# Patient Record
Sex: MALE | Employment: FULL TIME | URBAN - METROPOLITAN AREA
[De-identification: names, ages, dates, MRNs, and addresses within clinical notes are randomized per-mention and may not be internally consistent; named-entity substitution may affect disease eponyms.]

---

## 2019-10-23 ENCOUNTER — OFFICE VISIT (OUTPATIENT)
Dept: INTERNAL MEDICINE | Facility: CLINIC | Age: 59
End: 2019-10-23
Payer: COMMERCIAL

## 2019-10-23 VITALS
RESPIRATION RATE: 12 BRPM | BODY MASS INDEX: 29.54 KG/M2 | HEART RATE: 84 BPM | OXYGEN SATURATION: 98 % | SYSTOLIC BLOOD PRESSURE: 122 MMHG | WEIGHT: 211 LBS | TEMPERATURE: 98 F | DIASTOLIC BLOOD PRESSURE: 78 MMHG | HEIGHT: 71 IN

## 2019-10-23 DIAGNOSIS — J02.0 STREPTOCOCCAL PHARYNGITIS: Primary | ICD-10-CM

## 2019-10-23 PROCEDURE — 99203 OFFICE O/P NEW LOW 30 MIN: CPT | Performed by: PHYSICIAN ASSISTANT

## 2019-10-23 RX ORDER — AMOXICILLIN 500 MG/1
500 CAPSULE ORAL 2 TIMES DAILY
Qty: 20 CAPSULE | Refills: 0 | Status: SHIPPED | OUTPATIENT
Start: 2019-10-23 | End: 2019-11-02

## 2019-10-23 ASSESSMENT — MIFFLIN-ST. JEOR: SCORE: 1791.28

## 2019-10-23 NOTE — PROGRESS NOTES
"Subjective     Chidi Cleveland is a 59 year old male who presents to clinic today for the following health issues:    HPI   RESPIRATORY SYMPTOMS      Duration: 7-10 days    Description  sore throat, chills, ear pain bilateral while swallowing and swollen lymph nodes  Throat the worse   Chills/achy/sweats   No cough     Severity: severe    Accompanying signs and symptoms: None    History (predisposing factors):  Was in a hospital with his wife about 2 weeks ago     Precipitating or alleviating factors: got worse today    Therapies tried and outcome:  Ibuprofen and Dayquil    Reviewed and updated as needed this visit by Provider  Meds  Problems             Objective    /78   Pulse 84   Temp 98  F (36.7  C) (Oral)   Resp 12   Ht 1.791 m (5' 10.5\")   Wt 95.7 kg (211 lb)   SpO2 98%   BMI 29.85 kg/m    Body mass index is 29.85 kg/m .  Physical Exam   GENERAL: healthy, alert and no distress  EYES: Eyes grossly normal to inspection, PERRL and conjunctivae and sclerae normal  HENT: normal cephalic/atraumatic, ear canals and TM's normal, nose and mouth without ulcers or lesions, oropharynx clear and oral mucous membranes moist- erythema and swollen tonsils, but no signs of exudates or abscesses   NECK: bilateral anterior cervical adenopathy  RESP: lungs clear to auscultation - no rales, rhonchi or wheezes  CV: regular rates and rhythm, normal S1 S2, no S3 or S4 and no murmur, click or rub    Diagnostic Test Results:  Labs reviewed in Epic        Assessment & Plan     1. Streptococcal pharyngitis  - with fever, erythematous and swollen tonsils, and lymphadenopathy without cough, treated presumptively for strep   - reviewed when to follow up and when to seek emergency care  - reviewed when swollen nodes should return to normal and follow up if this does not occur   - amoxicillin (AMOXIL) 500 MG capsule; Take 1 capsule (500 mg) by mouth 2 times daily for 10 days  Dispense: 20 capsule; Refill: 0     Return in about " 1 month (around 11/23/2019) for follow up lymph nodes .    Mis Abdullahi PA-C  [facility]

## 2023-02-09 ENCOUNTER — OFFICE VISIT (OUTPATIENT)
Dept: URGENT CARE | Facility: URGENT CARE | Age: 63
End: 2023-02-09
Payer: COMMERCIAL

## 2023-02-09 VITALS
DIASTOLIC BLOOD PRESSURE: 70 MMHG | HEART RATE: 89 BPM | TEMPERATURE: 99 F | SYSTOLIC BLOOD PRESSURE: 130 MMHG | OXYGEN SATURATION: 97 %

## 2023-02-09 DIAGNOSIS — M25.50 ARTHRALGIA OF MULTIPLE JOINTS: Primary | ICD-10-CM

## 2023-02-09 LAB
ALBUMIN SERPL BCG-MCNC: 4 G/DL (ref 3.5–5.2)
ALBUMIN UR-MCNC: 100 MG/DL
ALP SERPL-CCNC: 61 U/L (ref 40–129)
ALT SERPL W P-5'-P-CCNC: 10 U/L (ref 10–50)
ANION GAP SERPL CALCULATED.3IONS-SCNC: 13 MMOL/L (ref 7–15)
APPEARANCE UR: CLEAR
AST SERPL W P-5'-P-CCNC: 18 U/L (ref 10–50)
BACTERIA #/AREA URNS HPF: ABNORMAL /HPF
BASOPHILS # BLD AUTO: 0 10E3/UL (ref 0–0.2)
BASOPHILS NFR BLD AUTO: 0 %
BILIRUB SERPL-MCNC: 0.9 MG/DL
BILIRUB UR QL STRIP: ABNORMAL
BUN SERPL-MCNC: 14.1 MG/DL (ref 8–23)
CALCIUM SERPL-MCNC: 8.9 MG/DL (ref 8.8–10.2)
CHLORIDE SERPL-SCNC: 100 MMOL/L (ref 98–107)
COLOR UR AUTO: YELLOW
CREAT SERPL-MCNC: 1.04 MG/DL (ref 0.67–1.17)
CRP SERPL-MCNC: 77.3 MG/L
DEPRECATED HCO3 PLAS-SCNC: 23 MMOL/L (ref 22–29)
EOSINOPHIL # BLD AUTO: 0.1 10E3/UL (ref 0–0.7)
EOSINOPHIL NFR BLD AUTO: 0 %
ERYTHROCYTE [DISTWIDTH] IN BLOOD BY AUTOMATED COUNT: 11.6 % (ref 10–15)
ERYTHROCYTE [SEDIMENTATION RATE] IN BLOOD BY WESTERGREN METHOD: 26 MM/HR (ref 0–20)
GFR SERPL CREATININE-BSD FRML MDRD: 82 ML/MIN/1.73M2
GLUCOSE SERPL-MCNC: 131 MG/DL (ref 70–99)
GLUCOSE UR STRIP-MCNC: NEGATIVE MG/DL
HCT VFR BLD AUTO: 44.8 % (ref 40–53)
HGB BLD-MCNC: 15 G/DL (ref 13.3–17.7)
HGB UR QL STRIP: NEGATIVE
IMM GRANULOCYTES # BLD: 0.1 10E3/UL
IMM GRANULOCYTES NFR BLD: 1 %
KETONES UR STRIP-MCNC: 80 MG/DL
LEUKOCYTE ESTERASE UR QL STRIP: NEGATIVE
LYMPHOCYTES # BLD AUTO: 0.6 10E3/UL (ref 0.8–5.3)
LYMPHOCYTES NFR BLD AUTO: 3 %
MCH RBC QN AUTO: 33 PG (ref 26.5–33)
MCHC RBC AUTO-ENTMCNC: 33.5 G/DL (ref 31.5–36.5)
MCV RBC AUTO: 99 FL (ref 78–100)
MONOCYTES # BLD AUTO: 0.7 10E3/UL (ref 0–1.3)
MONOCYTES NFR BLD AUTO: 3 %
MUCOUS THREADS #/AREA URNS LPF: PRESENT /LPF
NEUTROPHILS # BLD AUTO: 17.8 10E3/UL (ref 1.6–8.3)
NEUTROPHILS NFR BLD AUTO: 93 %
NITRATE UR QL: NEGATIVE
NRBC # BLD AUTO: 0 10E3/UL
NRBC BLD AUTO-RTO: 0 /100
PH UR STRIP: 6 [PH] (ref 5–7)
PLATELET # BLD AUTO: 227 10E3/UL (ref 150–450)
POTASSIUM SERPL-SCNC: 4.5 MMOL/L (ref 3.4–5.3)
PROT SERPL-MCNC: 7.3 G/DL (ref 6.4–8.3)
RBC # BLD AUTO: 4.55 10E6/UL (ref 4.4–5.9)
RBC #/AREA URNS AUTO: ABNORMAL /HPF
SODIUM SERPL-SCNC: 136 MMOL/L (ref 136–145)
SP GR UR STRIP: 1.02 (ref 1–1.03)
SQUAMOUS #/AREA URNS AUTO: ABNORMAL /LPF
TSH SERPL DL<=0.005 MIU/L-ACNC: 1.16 UIU/ML (ref 0.3–4.2)
UROBILINOGEN UR STRIP-ACNC: 1 E.U./DL
WBC # BLD AUTO: 19.2 10E3/UL (ref 4–11)
WBC #/AREA URNS AUTO: ABNORMAL /HPF

## 2023-02-09 PROCEDURE — 36415 COLL VENOUS BLD VENIPUNCTURE: CPT | Performed by: FAMILY MEDICINE

## 2023-02-09 PROCEDURE — 86140 C-REACTIVE PROTEIN: CPT | Performed by: FAMILY MEDICINE

## 2023-02-09 PROCEDURE — 86431 RHEUMATOID FACTOR QUANT: CPT | Performed by: FAMILY MEDICINE

## 2023-02-09 PROCEDURE — 86706 HEP B SURFACE ANTIBODY: CPT | Performed by: FAMILY MEDICINE

## 2023-02-09 PROCEDURE — 99000 SPECIMEN HANDLING OFFICE-LAB: CPT | Performed by: FAMILY MEDICINE

## 2023-02-09 PROCEDURE — 80050 GENERAL HEALTH PANEL: CPT | Performed by: FAMILY MEDICINE

## 2023-02-09 PROCEDURE — 86618 LYME DISEASE ANTIBODY: CPT | Performed by: FAMILY MEDICINE

## 2023-02-09 PROCEDURE — 81001 URINALYSIS AUTO W/SCOPE: CPT | Performed by: FAMILY MEDICINE

## 2023-02-09 PROCEDURE — 87340 HEPATITIS B SURFACE AG IA: CPT | Performed by: FAMILY MEDICINE

## 2023-02-09 PROCEDURE — 86803 HEPATITIS C AB TEST: CPT | Performed by: FAMILY MEDICINE

## 2023-02-09 PROCEDURE — 85652 RBC SED RATE AUTOMATED: CPT | Performed by: FAMILY MEDICINE

## 2023-02-09 PROCEDURE — 99204 OFFICE O/P NEW MOD 45 MIN: CPT | Performed by: FAMILY MEDICINE

## 2023-02-09 PROCEDURE — 86747 PARVOVIRUS ANTIBODY: CPT | Mod: 90 | Performed by: FAMILY MEDICINE

## 2023-02-09 NOTE — PROGRESS NOTES
ASSESSMENT/ PLAN:  Arthralgia of multiple joints     - Lyme Disease Total Abs Bld with Reflex to Confirm CLIA; Future  - Hepatitis C antibody; Future  - Hepatitis B surface antigen; Future  - Hepatitis B Surface Antibody; Future  - Comprehensive metabolic panel (BMP + Alb, Alk Phos, ALT, AST, Total. Bili, TP); Future  - UA Macro with Reflex to Micro and Culture - lab collect; Future  - Parvovirus B19 antibodies IgG IgM; Future  - ESR: Erythrocyte sedimentation rate; Future  - CRP, inflammation; Future  - CBC with platelets and differential; Future  - Rheumatoid factor; Future  - TSH with free T4 reflex; Future  - Lyme Disease Total Abs Bld with Reflex to Confirm CLIA  - Hepatitis C antibody  - Hepatitis B surface antigen  - Hepatitis B Surface Antibody  - Comprehensive metabolic panel (BMP + Alb, Alk Phos, ALT, AST, Total. Bili, TP)  - UA Macro with Reflex to Micro and Culture - lab collect  - Parvovirus B19 antibodies IgG IgM  - ESR: Erythrocyte sedimentation rate  - CRP, inflammation  - CBC with platelets and differential  - Rheumatoid factor  - TSH with free T4 reflex  - Urine Microscopic        We discussed the possible causes of the patient's musculoskeletal pain  ,     We discussed the possibilities of viral infection, lyme disease autoimmune disease, and malignancy    that are the most common causes of these symptoms.    Screening tests will be performed today to evaluate common infections and testing of for signs of generalized inflammation that can be a sign of an autoimmune disorder or malignancy    Patient will be informed of results and may need to follow-up with primary care or with rheumatology  for ongoing testing/ evaluation if the symptoms persist or worsen    Called patient at home with initial lab results-  His elevated WBC and elevated sed rate are suggestive of infection.   He denies abdominal pain or chest pain to suspect abdominal abscess or  Pneumonia.  -  If symptoms develop in the  coming days he should go to the ER.  Urine shows no signs of infection.  He will be called about subsequent lab results and will be able to review lab results with primary care at his scheduled follow-up appointment.     acetaminophen and/or ibuprofen for pain  Warm packs to the site of pain     ------------------------------------------------------------------------------------------------------    SUBJECTIVE:  Chief Complaint   Patient presents with     Musculoskeletal Problem     Started with knee, ankle pain a week ago and now having upper body pains as well.      Chidi Cleveland is a 62 year old male who presents with a chief complaint of bilateral ankle, knee, wrist, elbow, shoulder, hand, back and neck pain, tenderness and decreased range of motion in the joints  There has been slight to no noted swelling of the   joints  There has been all day and morning stiffness especially in the bilateral knees, but increasing  The joint pain is present in the morning and is persistent as the day goes on  Symptoms began 1 week(s) ago, are moderate and worsening and has spread to more joints-  First the knees and ankles, now back, neck, wrists, shoulder, elbows, fingers  Context:  He has not had signs of illness, no cough, no GI symptoms  There has been no associated fevers/ chills,  No Muscle pain, No  Nausea/ vomiting  History of arthritis/ gout  or autoimmune disorders:  none  Family history of arthritis or autoimmune disorders -  His mother had rheumatoid arthritis  Recent travel or exposure to similar illnesses: - none known,  Though he may have had distant tick bite  Recent illness  none    Injury:None       PMH:  No history of chronic health conditions    ALLERGIES:  Aspirin    MEDs  No current outpatient medications on file prior to visit.  No current facility-administered medications on file prior to visit.      Social History     Tobacco Use     Smoking status: Former     Packs/day: 0.00     Types: Cigarettes      Quit date: 2005     Years since quittin.5     Smokeless tobacco: Never   Substance Use Topics     Alcohol use: Yes     Comment: case of beer/week (24)        Family History   Problem Relation Age of Onset     Cancer Father         renal cell carcinoma (60)     Genitourinary Problems Mother         kidney stones     Arthritis Mother        ROS:  CONSTITUTIONAL:NEGATIVE for fever, chills,   INTEGUMENTARY/SKIN: NEGATIVE for worrisome rashes, or lesions  EYES: NEGATIVE for vision changes or irritation  ENT/MOUTH: NEGATIVE for ear, mouth and throat problems  RESP:NEGATIVE for significant cough or SOB  GI: NEGATIVE for nausea, abdominal pain,  or change in bowel habits       EXAM:   /70 (BP Location: Right arm, Patient Position: Chair, Cuff Size: Adult Regular)   Pulse 89   Temp 99  F (37.2  C) (Tympanic)   SpO2 97%   M/S Exam:bilateral ankle, knee, wrist, elbow, shoulder and hand with report of aching pain of the joint, not the muscles with movement and stress to the joints.    No erythema or swelling noted of the joints  Able to walk on toes, heels, tandem walk without difficulty,    GENERAL APPEARANCE: alert, moderate distress and cooperative  EYES: EOMI,  PERRL, conjunctiva clear  HENT: ear canals and TM's normal.  Nose and mouth without ulcers, erythema or lesions  NECK: supple, nontender, no lymphadenopathy  RESP: lungs clear to auscultation - no rales, rhonchi or wheezes  CV: regular rates and rhythm, normal S1 S2, no murmur noted  ABDOMEN:  soft, nontender, no HSM or masses and bowel sounds normal  NEURO: Normal strength and tone, sensory exam grossly normal,  normal speech and mentation  SKIN: no suspicious lesions or rashes       Results for orders placed or performed in visit on 23   UA Macro with Reflex to Micro and Culture - lab collect     Status: Abnormal    Specimen: Urine, Midstream   Result Value Ref Range    Color Urine Yellow Colorless, Straw, Light Yellow, Yellow    Appearance  Urine Clear Clear    Glucose Urine Negative Negative mg/dL    Bilirubin Urine Small (A) Negative    Ketones Urine 80 (A) Negative mg/dL    Specific Gravity Urine 1.020 1.003 - 1.035    Blood Urine Negative Negative    pH Urine 6.0 5.0 - 7.0    Protein Albumin Urine 100 (A) Negative mg/dL    Urobilinogen Urine 1.0 0.2, 1.0 E.U./dL    Nitrite Urine Negative Negative    Leukocyte Esterase Urine Negative Negative   ESR: Erythrocyte sedimentation rate     Status: Abnormal   Result Value Ref Range    Erythrocyte Sedimentation Rate 26 (H) 0 - 20 mm/hr   Urine Microscopic     Status: Abnormal   Result Value Ref Range    Bacteria Urine Few (A) None Seen /HPF    RBC Urine 0-2 0-2 /HPF /HPF    WBC Urine 0-5 0-5 /HPF /HPF    Squamous Epithelials Urine Few (A) None Seen /LPF    Mucus Urine Present (A) None Seen /LPF    Narrative    Urine Culture not indicated   CBC with platelets and differential     Status: None (In process)    Narrative    The following orders were created for panel order CBC with platelets and differential.  Procedure                               Abnormality         Status                     ---------                               -----------         ------                     CBC with platelets and d...[[id number]]                      In process                   Please view results for these tests on the individual orders.

## 2023-02-10 LAB
B BURGDOR IGG+IGM SER QL: 0.09
HBV SURFACE AB SERPL IA-ACNC: 1.79 M[IU]/ML
HBV SURFACE AB SERPL IA-ACNC: NONREACTIVE M[IU]/ML
HBV SURFACE AG SERPL QL IA: NONREACTIVE
HCV AB SERPL QL IA: NONREACTIVE
RHEUMATOID FACT SER NEPH-ACNC: <7 IU/ML

## 2023-02-14 ENCOUNTER — OFFICE VISIT (OUTPATIENT)
Dept: FAMILY MEDICINE | Facility: CLINIC | Age: 63
End: 2023-02-14

## 2023-02-14 VITALS
BODY MASS INDEX: 28.84 KG/M2 | TEMPERATURE: 97.6 F | SYSTOLIC BLOOD PRESSURE: 128 MMHG | HEART RATE: 56 BPM | OXYGEN SATURATION: 96 % | RESPIRATION RATE: 20 BRPM | HEIGHT: 71 IN | WEIGHT: 206 LBS | DIASTOLIC BLOOD PRESSURE: 80 MMHG

## 2023-02-14 DIAGNOSIS — M25.50 POLYARTHRALGIA: ICD-10-CM

## 2023-02-14 DIAGNOSIS — Z78.9 DAILY CONSUMPTION OF ALCOHOL: ICD-10-CM

## 2023-02-14 DIAGNOSIS — Z82.49 FAMILY HISTORY OF ISCHEMIC HEART DISEASE: ICD-10-CM

## 2023-02-14 DIAGNOSIS — Z13.220 LIPID SCREENING: ICD-10-CM

## 2023-02-14 DIAGNOSIS — Z71.89 ACP (ADVANCE CARE PLANNING): ICD-10-CM

## 2023-02-14 DIAGNOSIS — Z76.89 HEALTH CARE HOME: ICD-10-CM

## 2023-02-14 DIAGNOSIS — Z87.891 FORMER SMOKER: ICD-10-CM

## 2023-02-14 DIAGNOSIS — Z13.1 DIABETES MELLITUS SCREENING: ICD-10-CM

## 2023-02-14 DIAGNOSIS — Z00.00 ROUTINE PHYSICAL EXAMINATION: Primary | ICD-10-CM

## 2023-02-14 DIAGNOSIS — Z12.11 COLON CANCER SCREENING: ICD-10-CM

## 2023-02-14 DIAGNOSIS — Z12.5 PROSTATE CANCER SCREENING: ICD-10-CM

## 2023-02-14 LAB
% GRANULOCYTES: 72.6 %
B19V IGG SER IA-ACNC: 1.2 IV
B19V IGM SER IA-ACNC: 0.28 IV
CHOLEST SERPL-MCNC: 115 MG/DL (ref 0–199)
CHOLEST/HDLC SERPL: 4 {RATIO} (ref 0–5)
ERYTHROCYTE [SEDIMENTATION RATE] IN BLOOD: 30 MM/HR (ref 0–20)
HCT VFR BLD AUTO: 44.3 % (ref 40–53)
HDLC SERPL-MCNC: 32 MG/DL (ref 40–150)
HEMOGLOBIN: 14.9 G/DL (ref 13.3–17.7)
LDLC SERPL CALC-MCNC: 70 MG/DL (ref 0–130)
LYMPHOCYTES NFR BLD AUTO: 20.4 %
MCH RBC QN AUTO: 33.3 PG (ref 26–33)
MCHC RBC AUTO-ENTMCNC: 33.6 G/DL (ref 31–36)
MCV RBC AUTO: 98.9 FL (ref 78–100)
MONOCYTES NFR BLD AUTO: 7 %
PLATELET COUNT - QUEST: 336 10^9/L (ref 150–375)
RBC # BLD AUTO: 4.48 10*12/L (ref 4.4–5.9)
TRIGL SERPL-MCNC: 65 MG/DL (ref 0–149)
WBC # BLD AUTO: 6.7 10*9/L (ref 4–11)

## 2023-02-14 PROCEDURE — 36415 COLL VENOUS BLD VENIPUNCTURE: CPT | Performed by: STUDENT IN AN ORGANIZED HEALTH CARE EDUCATION/TRAINING PROGRAM

## 2023-02-14 PROCEDURE — 99386 PREV VISIT NEW AGE 40-64: CPT | Performed by: STUDENT IN AN ORGANIZED HEALTH CARE EDUCATION/TRAINING PROGRAM

## 2023-02-14 PROCEDURE — 80061 LIPID PANEL: CPT | Performed by: STUDENT IN AN ORGANIZED HEALTH CARE EDUCATION/TRAINING PROGRAM

## 2023-02-14 PROCEDURE — 85651 RBC SED RATE NONAUTOMATED: CPT | Performed by: STUDENT IN AN ORGANIZED HEALTH CARE EDUCATION/TRAINING PROGRAM

## 2023-02-14 PROCEDURE — 85025 COMPLETE CBC W/AUTO DIFF WBC: CPT | Performed by: STUDENT IN AN ORGANIZED HEALTH CARE EDUCATION/TRAINING PROGRAM

## 2023-02-14 NOTE — PATIENT INSTRUCTIONS
Labs today    Cologuard test will be mailed to you, call if you don't receive it    [facility]   Dr. Marsha Jeffers  [address]  [address]  [place] [address] [phone] -- appt line    [facility]  [address]  [address]  [facility] [address] [phone] -- appt line    Follow-up yearly, sooner if any concerns

## 2023-02-14 NOTE — PROGRESS NOTES
3  SUBJECTIVE:   CC: Chidi Cleveland is an 62 year old male who presents for preventive health visit.     Patient has been advised of split billing requirements and indicates understanding: Yes    Nursing Notes:   Dionne Cox CMA  2023  9:08 AM  Signed  Chief Complaint   Patient presents with     New Patient     New patient to this clinic      Physical     Annual, fasting      Consult For     Was seen at [facility] in [place] for severe persistent joint pain, did a lot of labs, wants to review this more with Dr. Chavez, feels that it was all due to an infection      Pre-visit Screening:  Immunizations:  up to date  Colonoscopy:  Is due but patient declines at this time   Mammogram: NA  Asthma Action Test/Plan:  NA  PHQ9:  Given today-new patient  GAD7:  Given today-new patient   Questioned patient about current smoking habits Pt. quit smoking some time ago.  Ok to leave detailed message on voice mail for today's visit only Yes, phone # [phone]          Healthy Habits:    General health: pretty good    Exercise: active job    Mental Health: no concerns      Problems taking medications regularly not applicable    Have you had an eye exam in the past two years? due    Do you see a dentist twice per year? yes    Do you have sleep apnea, excessive snoring or daytime drowsiness?no    Musculoskeletal problem/pain  Seen in urgent care  for multiple joint pains. Notes and labs reviewed.  Sx have resolved.      Today's PHQ-2 Score: declines    Do you feel safe in your environment? Yes      Social History     Tobacco Use     Smoking status: Former     Packs/day: 1.00     Years: 10.00     Pack years: 10.00     Types: Cigarettes, Cigars     Quit date: 1990     Years since quittin.1     Smokeless tobacco: Never   Substance Use Topics     Alcohol use: Yes     Comment: 2-4 beers/day     If you drink alcohol do you typically have >3 drinks per day or >7 drinks per week? Yes - AUDIT SCORE:     No  flowsheet data found.                      Last PSA:   [facility]ott PSA   Date Value Ref Range Status   02/14/2023 0.66 < OR = 4.00 ng/mL Final     Comment:     The total PSA value from this assay system is   standardized against the WHO standard. The test   result will be approximately 20% lower when compared   to the equimolar-standardized total PSA (Sendy   Benedict). Comparison of serial PSA results should be   interpreted with this fact in mind.     This test was performed using the Siemens   chemiluminescent method. Values obtained from   different assay methods cannot be used  interchangeably. PSA levels, regardless of  value, should not be interpreted as absolute  evidence of the presence or absence of disease.         Reviewed orders with patient. Reviewed health maintenance and updated orders accordingly - Yes  Lab work is in process  Labs reviewed in EPIC  BP Readings from Last 3 Encounters:   02/14/23 128/80   02/09/23 130/70   10/23/19 122/78    Wt Readings from Last 3 Encounters:   02/14/23 93.4 kg (206 lb)   10/23/19 95.7 kg (211 lb)   07/16/07 92.1 kg (203 lb)                    Reviewed and updated as needed this visit by clinical staff   Tobacco  Allergies  Meds              Reviewed and updated as needed this visit by Provider                 No past medical history on file.   Past Surgical History:   Procedure Laterality Date     ZZC APPENDECTOMY  1977     Family History   Problem Relation Age of Onset     Cerebrovascular Disease Mother         hemorrhagic     Rheumatoid Arthritis Mother      Genitourinary Problems Mother         kidney stones     Hyperlipidemia Father      Hypertension Father      Coronary Artery Disease Father      Cancer Father         renal cell carcinoma (60)     Deep Vein Thrombosis (DVT) Father      Unknown/Adopted Sister      Diabetes Brother      Coronary Artery Disease Brother         MI in late 50s     Cerebrovascular Disease Paternal Grandmother      Cerebrovascular  "Disease Paternal Grandfather      Gout Paternal Grandfather      Colon Cancer No family hx of      Prostate Cancer No family hx of        ROS:  12 point ROS performed and negative for new concerns except as mentioned above     OBJECTIVE:   /80 (BP Location: Right arm, Patient Position: Sitting, Cuff Size: Adult Large)   Pulse 56   Temp 97.6  F (36.4  C) (Temporal)   Resp 20   Ht 1.803 m (5' 11\")   Wt 93.4 kg (206 lb)   SpO2 96%   BMI 28.73 kg/m    EXAM:  GENERAL: healthy, alert and no distress  EYES: Eyes grossly normal to inspection, PERRL and conjunctivae and sclerae normal  HENT: ear canals and TM's normal, nose and mouth without ulcers or lesions  NECK: no adenopathy, no asymmetry, masses, or scars and thyroid normal to palpation  RESP: lungs clear to auscultation - no rales, rhonchi or wheezes  CV: regular rate and rhythm, normal S1 S2, no S3 or S4, no murmur, click or rub, no peripheral edema and peripheral pulses strong  ABDOMEN: soft, nontender, no hepatosplenomegaly, no masses and bowel sounds normal  MS: no gross musculoskeletal defects noted, no edema  SKIN: no suspicious lesions or rashes  NEURO: Normal strength and tone, mentation intact and speech normal  PSYCH: mentation appears normal, affect normal/bright    Diagnostic Test Results:  Labs reviewed in Epic    ASSESSMENT/PLAN:       ICD-10-CM    1. Routine physical examination  Z00.00       2. ACP (advance care planning)  Z71.89       3. Health Care Home  Z76.89       4. Former smoker  Z87.891 Adult Cardiology Eval [facility] Referral      5. Daily consumption of alcohol  Z78.9       6. Prostate cancer screening  Z12.5 PSA Total ([facility])      7. Polyarthralgia  M25.50 ESR WESTERGREN ([facility])     C-Reactive Protein CRP ([facility])     HEMOGRAM PLATELET DIFF ([facility])      8. Colon cancer screening  Z12.11 COLOGUARD([facility])      9. Diabetes mellitus screening  Z13.1 HEMOGLOBIN A1C ([facility])     CANCELED: HEMOGLOBIN A1C ([facility])      10. Lipid " "screening  Z13.220 Lipid Panel ([facility])      11. Family history of ischemic heart disease  Z82.49 Adult Cardiology Eval  Referral         Arthralgias resolved, recheck labs to ensure WBC and inflammatory markers normalizing    Dad and younger brother with hx of MI, both LAD. Pt asx, labs today, referral to Dr. Jeffers to discuss further screening.     Patient has been advised of split billing requirements and indicates understanding: Yes  COUNSELING:  Reviewed preventive health counseling, as reflected in patient instructions       Consider AAA screening for ages 65-75 and > 100 cig smoking history or family history of AAA       Regular exercise       Healthy diet/nutrition       Vision screening       Hearing screening       Immunizations       Alcohol Use        Colorectal cancer screening       Prostate cancer screening    Estimated body mass index is 28.73 kg/m  as calculated from the following:    Height as of this encounter: 1.803 m (5' 11\").    Weight as of this encounter: 93.4 kg (206 lb).    Weight management plan: Discussed healthy diet and exercise guidelines    He reports that he quit smoking about 33 years ago. His smoking use included cigarettes and cigars. He has a 10.00 pack-year smoking history. He has never used smokeless tobacco.      Shane Chavez MD, [facility]    "

## 2023-02-14 NOTE — NURSING NOTE
Chief Complaint   Patient presents with     New Patient     New patient to this clinic      Physical     Annual, fasting      Consult For     Was seen at [facility] in [place] for severe persistent joint pain, did a lot of labs, wants to review this more with Dr. Chavez, feels that it was all due to an infection      Pre-visit Screening:  Immunizations:  up to date  Colonoscopy:  Is due but patient declines at this time   Mammogram: NA  Asthma Action Test/Plan:  NA  PHQ9:  Given today-new patient  GAD7:  Given today-new patient   Questioned patient about current smoking habits Pt. quit smoking some time ago.  Ok to leave detailed message on voice mail for today's visit only Yes, phone # [phone]

## 2023-02-15 LAB
ABBOTT PSA - QUEST: 0.66 NG/ML
CRP SERPL-MCNC: 13.7 MG/L (ref 0–0.8)
HBA1C MFR BLD: 5.2 % OF TOTAL HGB (ref 0–5.7)

## 2023-03-14 LAB — NONINV COLON CA DNA+OCC BLD SCRN STL QL: NEGATIVE

## 2023-03-26 ENCOUNTER — HEALTH MAINTENANCE LETTER (OUTPATIENT)
Age: 63
End: 2023-03-26

## 2023-06-09 ENCOUNTER — TRANSFERRED RECORDS (OUTPATIENT)
Dept: FAMILY MEDICINE | Facility: CLINIC | Age: 63
End: 2023-06-09

## 2023-07-18 ENCOUNTER — TRANSFERRED RECORDS (OUTPATIENT)
Dept: FAMILY MEDICINE | Facility: CLINIC | Age: 63
End: 2023-07-18

## 2023-09-11 ENCOUNTER — TRANSFERRED RECORDS (OUTPATIENT)
Dept: FAMILY MEDICINE | Facility: CLINIC | Age: 63
End: 2023-09-11

## 2024-03-23 ENCOUNTER — HEALTH MAINTENANCE LETTER (OUTPATIENT)
Age: 64
End: 2024-03-23

## 2024-06-17 ENCOUNTER — TRANSFERRED RECORDS (OUTPATIENT)
Dept: FAMILY MEDICINE | Facility: CLINIC | Age: 64
End: 2024-06-17

## 2024-06-17 PROBLEM — Z76.89 HEALTH CARE HOME: Status: RESOLVED | Noted: 2023-02-14 | Resolved: 2024-06-17

## 2025-04-12 ENCOUNTER — HEALTH MAINTENANCE LETTER (OUTPATIENT)
Age: 65
End: 2025-04-12